# Patient Record
Sex: FEMALE | Race: WHITE | Employment: UNEMPLOYED | URBAN - METROPOLITAN AREA
[De-identification: names, ages, dates, MRNs, and addresses within clinical notes are randomized per-mention and may not be internally consistent; named-entity substitution may affect disease eponyms.]

---

## 2020-08-30 ENCOUNTER — HOSPITAL ENCOUNTER (OUTPATIENT)
Age: 35
Discharge: HOME | End: 2020-08-30
Payer: MEDICAID

## 2020-08-30 VITALS
HEIGHT: 68 IN | WEIGHT: 130 LBS | HEART RATE: 66 BPM | DIASTOLIC BLOOD PRESSURE: 74 MMHG | RESPIRATION RATE: 18 BRPM | OXYGEN SATURATION: 98 % | BODY MASS INDEX: 19.7 KG/M2 | SYSTOLIC BLOOD PRESSURE: 106 MMHG | TEMPERATURE: 99 F

## 2020-08-30 DIAGNOSIS — J02.0 STREP PHARYNGITIS: Primary | ICD-10-CM

## 2020-08-30 LAB — POCT RAPID STREP: POSITIVE

## 2020-08-30 PROCEDURE — 99202 OFFICE O/P NEW SF 15 MIN: CPT | Performed by: PHYSICIAN ASSISTANT

## 2020-08-30 PROCEDURE — 87880 STREP A ASSAY W/OPTIC: CPT | Performed by: PHYSICIAN ASSISTANT

## 2020-08-30 RX ORDER — AZITHROMYCIN 250 MG/1
TABLET, FILM COATED ORAL
Qty: 1 PACKAGE | Refills: 0 | Status: SHIPPED | OUTPATIENT
Start: 2020-08-30 | End: 2020-09-04

## 2020-08-30 RX ORDER — DEXAMETHASONE 2 MG/1
10 TABLET ORAL ONCE
Qty: 5 TABLET | Refills: 0 | Status: SHIPPED | OUTPATIENT
Start: 2020-08-30 | End: 2020-08-30

## 2020-08-30 NOTE — ED INITIAL ASSESSMENT (HPI)
Since Friday, c/o sore throat, tender neck, dizziness, body aches and chills. Denies fever. Took Motrin at 0900 today.

## 2020-08-30 NOTE — ED PROVIDER NOTES
Patient Seen in: [facility]      History   Patient presents with:  Sore Throat    Stated Complaint: sore throat/bodyaches/chills    HPI    CHIEF COMPLAINT: Sore throat, body aches, chills    HISTORY OF PRESENT ILLNESS: Mara (Temporal)   Resp 18   Ht 172.7 cm (5' 8\")   Wt 59 kg   LMP 08/05/2020   SpO2 98%   BMI 19.77 kg/m²         Physical Exam    Vital signs and nursing notes reviewed    General Appearance: alert and oriented x 4, no acute distress  Eyes:  pupils equal and r prevent relapse or worsening. I discussed the case with the patient and they had no questions, complaints, or concerns. Patient states they understand diagnosis, followup plan and agree with and understand  discharge instructions and plan.  I answered al

## 2020-11-05 ENCOUNTER — HOSPITAL ENCOUNTER (OUTPATIENT)
Age: 35
Discharge: HOME | End: 2020-11-05
Payer: MEDICAID

## 2020-11-05 VITALS
HEIGHT: 67.99 IN | HEART RATE: 59 BPM | TEMPERATURE: 98 F | SYSTOLIC BLOOD PRESSURE: 117 MMHG | OXYGEN SATURATION: 99 % | BODY MASS INDEX: 19.25 KG/M2 | DIASTOLIC BLOOD PRESSURE: 74 MMHG | RESPIRATION RATE: 16 BRPM | WEIGHT: 127 LBS

## 2020-11-05 DIAGNOSIS — Z20.822 PERSON UNDER INVESTIGATION FOR COVID-19: ICD-10-CM

## 2020-11-05 DIAGNOSIS — B34.9 VIRAL SYNDROME: Primary | ICD-10-CM

## 2020-11-05 PROCEDURE — 99213 OFFICE O/P EST LOW 20 MIN: CPT | Performed by: PHYSICIAN ASSISTANT

## 2020-11-05 NOTE — ED PROVIDER NOTES
Patient Seen in: [facility] [address] [place] [address]      History   Patient presents with:  Runny Nose  Body ache and/or chills    Stated Complaint: Bridger Tejeda / Cortney Paulson / Amee Hayes / Nelda De La Rosa    [facility]    50-year-old female who comes in today complaining of genera unlabored      ED Course     Labs Reviewed   SARS-COV-2 RNA,QUAL RT-PCR ([facility])                MDM    I discussed with the patient quarantine instructions, COVID-19 instructions and conservative care.  Patient will remain self quarantined until results are

## 2020-11-05 NOTE — ED INITIAL ASSESSMENT (HPI)
Pt c/o nasal congestion and chills starting last night. Pt denies fever.  Pt is requesting a covid test.

## 2021-09-15 ENCOUNTER — PATIENT MESSAGE (OUTPATIENT)
Dept: ADMINISTRATIVE | Facility: HOSPITAL | Age: 36
End: 2021-09-15

## 2021-09-15 ENCOUNTER — TELEPHONE (OUTPATIENT)
Dept: INTERNAL MEDICINE CLINIC | Facility: HOSPITAL | Age: 36
End: 2021-09-15

## 2021-09-15 DIAGNOSIS — Z20.822 EXPOSURE TO CONFIRMED CASE OF COVID-19: Primary | ICD-10-CM

## 2021-09-15 NOTE — TELEPHONE ENCOUNTER
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 days  When was the last shift you worked? 9/14/2021  When are you next scheduled to work?  Completed rotation    Did you have close contact with someone on your unit while not wearing a mask? ( exposure). [x] Asymptomatic AND vaccinated or COVID infection in past 3 months: May work and continue to monitor symptoms for the                                       next 14 days. Test w/ [facility] 3-5 days after exposure.

## 2021-09-17 ENCOUNTER — LAB ENCOUNTER (OUTPATIENT)
Dept: LAB | Age: 36
End: 2021-09-17
Attending: PREVENTIVE MEDICINE

## 2021-09-17 DIAGNOSIS — Z20.822 EXPOSURE TO CONFIRMED CASE OF COVID-19: ICD-10-CM

## 2021-09-19 LAB — SARS-COV-2 RNA RESP QL NAA+PROBE: NOT DETECTED

## 2021-09-19 NOTE — TELEPHONE ENCOUNTER
Results and RTW guidelines:    COVID RESULT:    [x] Viewed by employee in [address] RTW plan and instructions as indicated on triage call. Estimated RTW date: n/a finished rotation  [] Discussed with employee   [] Unable to reach by phone.   Sent via My

## 2021-11-06 RX ORDER — ALBUTEROL SULFATE 90 UG/1
2 AEROSOL, METERED RESPIRATORY (INHALATION) EVERY 6 HOURS PRN
Qty: 1 EACH | Refills: 0 | Status: SHIPPED | OUTPATIENT
Start: 2021-11-06 | End: 2022-11-06

## 2023-04-07 RX ORDER — CEFAZOLIN SODIUM/WATER 2 G/20 ML
2 SYRINGE (ML) INTRAVENOUS ONCE
Status: CANCELLED | OUTPATIENT
Start: 2023-04-07 | End: 2023-04-07

## 2023-04-17 NOTE — H&P
[facility]    PATIENT'S NAME: Carlita Fitch PHYSICIAN: Liliana Flaherty M.D. PATIENT ACCOUNT#:   [de-identified]    LOCATION:    MEDICAL RECORD #:   [id number]       YOB: 1985  ADMISSION DATE:       2023    HISTORY AND PHYSICAL EXAMINATION    CHIEF COMPLAINT:  \"I'm here to have hysteroscopy. \"    HISTORY OF PRESENT ILLNESS:  The patient is a 68-year-old female,  3, para 2-0-1-2, who was seen in the office with complaints of heavy periods. The patient notes her periods have been heavy for approximately 7 years. She notes on her heavy days, she changes a tampon and a pad every hour. She does have clotting. The clots are dime size to quarter size. She had a GYN ultrasound saline infusion sonogram performed 2023 which noted a retroflexed uterus with a 12.12 mm endometrial lining. No adnexal masses were noted. There was a 3.3 x 0.2 x 2.0 cm area within the endometrial canal suspicious for an endometrial polyp. These results were discussed with the patient. Recommendation of operative hysteroscopy with TruClear discussed. Risks and benefits discussed, and the patient presently desires to proceed with operative hysteroscopy with TruClear. PAST MEDICAL HISTORY:  Anemia, psoriasis. PAST SURGICAL HISTORY:  Noncontributory. MEDICATIONS:  Present medications are amino acid, B12, B6, broccoli seed extract, creatine, D-chiro, D3, fish oil, folate, Gume-Inositol, N-acetyl lysine, resveratrol, Urolithin A. ALLERGIES:  The patient is allergic to penicillin. She gets a rash. FAMILY HISTORY:  Breast cancer in maternal great-grandmother, hypertension in her mother, elevated cholesterol in her mother, lung cancer in paternal grandmother, and polycythemia in her mother. SOCIAL HISTORY:  Occasional EtOH. No tobacco.  No drugs. GYNECOLOGICAL HISTORY:  Menarche age 14. History of regular menses monthly, lasting 8 days, presently heavy.   No history of any sexually transmitted diseases. No history of an abnormal Pap smear. OBSTETRICAL HISTORY:  In , 7 weeks, spontaneous AB, no D and C. On 2010, 39 weeks, 3 hours of labor, 7 pound 6 ounce female infant, normal vaginal delivery, no complications. Her daughter, Izabella Virgen, was delivered in [place]. On 2013, 39 weeks, 6 pound 12 ounce female infant, normal vaginal delivery. Her daughter, Dilip Barros, was delivered in [place]. PHYSICAL EXAMINATION:    GENERAL:  The patient is 5 feet 7 inches. She weighs 131 pounds. VITAL SIGNS:  Blood pressure 116/70. HEENT:  Within normal limits. LUNGS:  Clear. HEART:  Regular rate and rhythm. ABDOMEN:  Benign. EXTREMITIES:  No CCE. PELVIC:  BUS, external genitalia within normal limits. Vagina, normal mucosa. Cervix, no gross lesions. Bimanual, no palpable abnormal masses, nontender. ASSESSMENT:    3   A 48-year-old female,  3, para 2-0-1-2, with complaints of abnormal uterine bleeding consistent with menorrhagia. 2.   Abnormal saline infusion sonogram.  3.   Low ferritin. PLAN:  The patient is scheduled for operative hysteroscopy with TruClear. The procedure and risks of procedure were discussed in depth with the patient. ACOG hysteroscopy and abnormal bleeding brochures and TruClear brochures were previously given to the patient to review. Procedure, risks of procedure discussed, and the patient consents to the above. Alternative forms of treatment and observation were discussed, and the patient declined. Preop labs done. Postop care discussed. Postop information given. The patient is to follow up for surgery on 2023 as scheduled or call as needed.      Dictated By Maci Varner M.D.  d: 2023 09:34:13  t: 2023 10:07:18  Job [id number]  [facility]/

## 2023-04-19 ENCOUNTER — HOSPITAL ENCOUNTER (OUTPATIENT)
Facility: HOSPITAL | Age: 38
Setting detail: HOSPITAL OUTPATIENT SURGERY
Discharge: HOME | End: 2023-04-19
Attending: OBSTETRICS & GYNECOLOGY | Admitting: OBSTETRICS & GYNECOLOGY
Payer: COMMERCIAL

## 2023-04-19 ENCOUNTER — ANESTHESIA EVENT (OUTPATIENT)
Dept: SURGERY | Facility: HOSPITAL | Age: 38
End: 2023-04-19
Payer: COMMERCIAL

## 2023-04-19 ENCOUNTER — ANESTHESIA (OUTPATIENT)
Dept: SURGERY | Facility: HOSPITAL | Age: 38
End: 2023-04-19
Payer: COMMERCIAL

## 2023-04-19 VITALS
HEIGHT: 68 IN | OXYGEN SATURATION: 100 % | HEART RATE: 53 BPM | WEIGHT: 131 LBS | BODY MASS INDEX: 19.85 KG/M2 | SYSTOLIC BLOOD PRESSURE: 94 MMHG | TEMPERATURE: 98 F | DIASTOLIC BLOOD PRESSURE: 61 MMHG | RESPIRATION RATE: 16 BRPM

## 2023-04-19 LAB — B-HCG UR QL: NEGATIVE

## 2023-04-19 PROCEDURE — 88305 TISSUE EXAM BY PATHOLOGIST: CPT | Performed by: OBSTETRICS & GYNECOLOGY

## 2023-04-19 PROCEDURE — 81025 URINE PREGNANCY TEST: CPT

## 2023-04-19 PROCEDURE — 0UB98ZX EXCISION OF UTERUS, VIA NATURAL OR ARTIFICIAL OPENING ENDOSCOPIC, DIAGNOSTIC: ICD-10-PCS | Performed by: OBSTETRICS & GYNECOLOGY

## 2023-04-19 RX ORDER — KETOROLAC TROMETHAMINE 30 MG/ML
INJECTION, SOLUTION INTRAMUSCULAR; INTRAVENOUS AS NEEDED
Status: DISCONTINUED | OUTPATIENT
Start: 2023-04-19 | End: 2023-04-19 | Stop reason: SURG

## 2023-04-19 RX ORDER — MIDAZOLAM HYDROCHLORIDE 1 MG/ML
1 INJECTION INTRAMUSCULAR; INTRAVENOUS EVERY 5 MIN PRN
Status: DISCONTINUED | OUTPATIENT
Start: 2023-04-19 | End: 2023-04-19

## 2023-04-19 RX ORDER — LABETALOL HYDROCHLORIDE 5 MG/ML
5 INJECTION, SOLUTION INTRAVENOUS EVERY 5 MIN PRN
Status: DISCONTINUED | OUTPATIENT
Start: 2023-04-19 | End: 2023-04-19

## 2023-04-19 RX ORDER — HYDROCODONE BITARTRATE AND ACETAMINOPHEN 5; 325 MG/1; MG/1
1 TABLET ORAL ONCE AS NEEDED
Status: DISCONTINUED | OUTPATIENT
Start: 2023-04-19 | End: 2023-04-19

## 2023-04-19 RX ORDER — LIDOCAINE HYDROCHLORIDE 10 MG/ML
INJECTION, SOLUTION INFILTRATION; PERINEURAL AS NEEDED
Status: DISCONTINUED | OUTPATIENT
Start: 2023-04-19 | End: 2023-04-19 | Stop reason: HOSPADM

## 2023-04-19 RX ORDER — CEFAZOLIN SODIUM/WATER 2 G/20 ML
2 SYRINGE (ML) INTRAVENOUS ONCE
Status: COMPLETED | OUTPATIENT
Start: 2023-04-19 | End: 2023-04-19

## 2023-04-19 RX ORDER — HYDROMORPHONE HYDROCHLORIDE 1 MG/ML
0.6 INJECTION, SOLUTION INTRAMUSCULAR; INTRAVENOUS; SUBCUTANEOUS EVERY 5 MIN PRN
Status: DISCONTINUED | OUTPATIENT
Start: 2023-04-19 | End: 2023-04-19

## 2023-04-19 RX ORDER — SODIUM CHLORIDE, SODIUM LACTATE, POTASSIUM CHLORIDE, CALCIUM CHLORIDE 600; 310; 30; 20 MG/100ML; MG/100ML; MG/100ML; MG/100ML
INJECTION, SOLUTION INTRAVENOUS CONTINUOUS
Status: DISCONTINUED | OUTPATIENT
Start: 2023-04-19 | End: 2023-04-19

## 2023-04-19 RX ORDER — SCOLOPAMINE TRANSDERMAL SYSTEM 1 MG/1
1 PATCH, EXTENDED RELEASE TRANSDERMAL ONCE
Status: DISCONTINUED | OUTPATIENT
Start: 2023-04-19 | End: 2023-04-19 | Stop reason: HOSPADM

## 2023-04-19 RX ORDER — ACETAMINOPHEN 500 MG
1000 TABLET ORAL ONCE
Status: DISCONTINUED | OUTPATIENT
Start: 2023-04-19 | End: 2023-04-19 | Stop reason: HOSPADM

## 2023-04-19 RX ORDER — CEFAZOLIN SODIUM/WATER 2 G/20 ML
SYRINGE (ML) INTRAVENOUS
Status: DISCONTINUED
Start: 2023-04-19 | End: 2023-04-19

## 2023-04-19 RX ORDER — LIDOCAINE HYDROCHLORIDE 10 MG/ML
INJECTION, SOLUTION EPIDURAL; INFILTRATION; INTRACAUDAL; PERINEURAL AS NEEDED
Status: DISCONTINUED | OUTPATIENT
Start: 2023-04-19 | End: 2023-04-19 | Stop reason: SURG

## 2023-04-19 RX ORDER — HYDROCODONE BITARTRATE AND ACETAMINOPHEN 5; 325 MG/1; MG/1
2 TABLET ORAL ONCE AS NEEDED
Status: DISCONTINUED | OUTPATIENT
Start: 2023-04-19 | End: 2023-04-19

## 2023-04-19 RX ORDER — NALOXONE HYDROCHLORIDE 0.4 MG/ML
80 INJECTION, SOLUTION INTRAMUSCULAR; INTRAVENOUS; SUBCUTANEOUS AS NEEDED
Status: DISCONTINUED | OUTPATIENT
Start: 2023-04-19 | End: 2023-04-19

## 2023-04-19 RX ORDER — VASOPRESSIN 20 U/ML
INJECTION PARENTERAL AS NEEDED
Status: DISCONTINUED | OUTPATIENT
Start: 2023-04-19 | End: 2023-04-19 | Stop reason: HOSPADM

## 2023-04-19 RX ORDER — HYDROMORPHONE HYDROCHLORIDE 1 MG/ML
0.4 INJECTION, SOLUTION INTRAMUSCULAR; INTRAVENOUS; SUBCUTANEOUS EVERY 5 MIN PRN
Status: DISCONTINUED | OUTPATIENT
Start: 2023-04-19 | End: 2023-04-19

## 2023-04-19 RX ORDER — MEPERIDINE HYDROCHLORIDE 25 MG/ML
12.5 INJECTION INTRAMUSCULAR; INTRAVENOUS; SUBCUTANEOUS AS NEEDED
Status: DISCONTINUED | OUTPATIENT
Start: 2023-04-19 | End: 2023-04-19

## 2023-04-19 RX ORDER — MIDAZOLAM HYDROCHLORIDE 1 MG/ML
INJECTION INTRAMUSCULAR; INTRAVENOUS AS NEEDED
Status: DISCONTINUED | OUTPATIENT
Start: 2023-04-19 | End: 2023-04-19 | Stop reason: SURG

## 2023-04-19 RX ORDER — ONDANSETRON 2 MG/ML
4 INJECTION INTRAMUSCULAR; INTRAVENOUS EVERY 6 HOURS PRN
Status: DISCONTINUED | OUTPATIENT
Start: 2023-04-19 | End: 2023-04-19

## 2023-04-19 RX ORDER — PROCHLORPERAZINE EDISYLATE 5 MG/ML
5 INJECTION INTRAMUSCULAR; INTRAVENOUS EVERY 8 HOURS PRN
Status: DISCONTINUED | OUTPATIENT
Start: 2023-04-19 | End: 2023-04-19

## 2023-04-19 RX ORDER — HYDROMORPHONE HYDROCHLORIDE 1 MG/ML
0.2 INJECTION, SOLUTION INTRAMUSCULAR; INTRAVENOUS; SUBCUTANEOUS EVERY 5 MIN PRN
Status: DISCONTINUED | OUTPATIENT
Start: 2023-04-19 | End: 2023-04-19

## 2023-04-19 RX ORDER — ACETAMINOPHEN 500 MG
1000 TABLET ORAL ONCE AS NEEDED
Status: DISCONTINUED | OUTPATIENT
Start: 2023-04-19 | End: 2023-04-19

## 2023-04-19 RX ORDER — ONDANSETRON 2 MG/ML
INJECTION INTRAMUSCULAR; INTRAVENOUS AS NEEDED
Status: DISCONTINUED | OUTPATIENT
Start: 2023-04-19 | End: 2023-04-19 | Stop reason: SURG

## 2023-04-19 RX ADMIN — CEFAZOLIN SODIUM/WATER 2 G: 2 G/20 ML SYRINGE (ML) INTRAVENOUS at 12:06:00

## 2023-04-19 RX ADMIN — KETOROLAC TROMETHAMINE 30 MG: 30 INJECTION, SOLUTION INTRAMUSCULAR; INTRAVENOUS at 12:01:00

## 2023-04-19 RX ADMIN — ONDANSETRON 4 MG: 2 INJECTION INTRAMUSCULAR; INTRAVENOUS at 11:59:00

## 2023-04-19 RX ADMIN — LIDOCAINE HYDROCHLORIDE 50 MG: 10 INJECTION, SOLUTION EPIDURAL; INFILTRATION; INTRACAUDAL; PERINEURAL at 11:36:00

## 2023-04-19 RX ADMIN — MIDAZOLAM HYDROCHLORIDE 2 MG: 1 INJECTION INTRAMUSCULAR; INTRAVENOUS at 11:34:00

## 2023-04-19 NOTE — DISCHARGE INSTRUCTIONS
Home Care Instructions Following Your Hysteroscopy     Dora-  We hope you were pleased with your care at [facility].  We wish you the best outcome and overall experience with your operation. These instructions will help to minimize pain, limit the risk for an infection, and improve the likelihood of a successful result. What to Expect:  Expect some vaginal bleeding, watery vaginal discharge, or spotting for 1-2 weeks after your procedure  You might also experience abdominal cramping for 1-2 days  Call the office, if you experience heavy bleeding (saturating a pad every hour)    Over-The-Counter Medication  Non-prescription anti-inflammatory medications can also help to ease the pain.   You can take Aleve, Tylenol or Ibuprofen   Take as directed on the bottle  Drink a full glass of water with the medication    Bathing/Showers  You may resume showers in one day  No baths, swimming, hot tubs for two weeks    Home Medication  Resume your home medications as instructed    Diet  Resume your normal diet    Activity  Refrain from vaginal intercourse, vaginal suppositories, tampon use or douches until your first office appointment with the doctor  No strenuous activity or heavy lifting for two days  You may go up and down the stairs as tolerated    No physical exercise, sports, or gym workouts for two days    Return to Work or [employer] may return to work in two days  Contact your gynecologist's office if you need a medical note  You may return to school in two days with no restrictions    Driving  You may resume driving tomorrow    Follow-up Appointment with Your Gynecologist  Call your gynecologist's office today for an appointment in two weeks    The number is [phone]  Verify your appointment date, day, time, and location  At your postoperative office appointment, your progress will be evaluated, findings reviewed, and any additional concerns and instructions will be discussed    Questions or Concerns  Call the office if you experience severe pain not controlled by pain medication, swelling, heavy bleeding, foul smelling vaginal discharge, shortness of breath, chest pain, leg pain, fever (100.4 or greater), or other concerns  The number is: [phone]  If your call is made after office hours, the physician on call will be available to help you. There is always a doctor from the group covering our gynecology patients, when your provider is unavailable    Alex Amin,  Thank you for coming to [facility] for your operation. The nurses and the anesthesiologist try very hard to make sure you receive the best care possible. Your trust in them as well as us is greatly appreciated.   Thanks so much,  [facility] or [facility]

## 2023-04-19 NOTE — BRIEF OP NOTE
[facility]  Post-Op Procedure Note    Zigmund Handy Patient Status:  Hospital Outpatient Surgery    1985 MRN [id number]   [facility] SURGERY Attending Dirk Lopez MD   Hosp Day # 0 PCP Magdalena Pitts MD     Preoperative Diagnosis: ABNORMAL UTERUS BLEEDING  Postoperative Diagnosis: ABNORMAL UTERUS BLEEDING  Procedure: OPERATIVE HYSTEROSCOPY WITH Alma Ricketts     Primary surgeon: Zayra Smith MD  Surgical Findings: Multiple endometrial polyps, thickened posterior endometrium otherwise normal endometrial cavity  Anesthesia: MAC-Dr [facility]  Anesthesiologist: Annalise Franklin MD  Antibiotics: Ancef 2 gm IV PB prior to procedure   Anti-Infectives          ceFAZolin (Ancef) 2 g in 20mL IV syringe premix (Completed)    ceFAZolin (Ancef) 2 g/20mL IV syringe premix        IV Fluids:  600 mL  Hysteroscopic Deficit: 115 mL  Urine Output:   100 ml   Estimated blood loss: 5 mL  Counts: Correct  Specimen:  Endometrial polyps and endometrial biopsies  Complications: None. Patient tolerated the procedure well. Condition: To same day surgery in stable condition.        Zayra Smith MD   2023  12:14 PM

## 2023-04-23 NOTE — OPERATIVE REPORT
[facility]    PATIENT'S NAME: Domingo Cardenas   ATTENDING PHYSICIAN: Dillon Marte M.D. OPERATING PHYSICIAN: Dillon Marte M.D. PATIENT ACCOUNT#:   [de-identified]    LOCATION:  [place] [address]  MEDICAL RECORD #:   [id number]       YOB: 1985  ADMISSION DATE:       04/19/2023      OPERATION DATE:  04/19/2023    OPERATIVE REPORT    PREOPERATIVE DIAGNOSIS:  Abnormal uterine bleeding. POSTOPERATIVE DIAGNOSIS:  Abnormal uterine bleeding, endometrial polyps. PROCEDURE:  Operative hysteroscopy with polypectomies and endometrial biopsies with TruClear. OPERATIVE TECHNIQUE:  The patient was brought into the operating room and placed in the dorsal supine position. MAC anesthesia was initiated by Dr. El Gates. The patient was placed in dorsal lithotomy position and prepped and draped in the normal sterile fashion. A red rubber catheter was used to drain her bladder of approximately 100 mL of clear urine. A speculum was placed inside the vagina. The cervix was grasped with a tenaculum. Then, 20 mL of lidocaine/Pitressin solution was injected into the cervix. The uterus was sounded to 8 to 9 cm. The cervix was then serially dilated to #6 Hegar dilator. The operating hysteroscope was placed inside the uterine cavity. It was difficult initially to visualize the inside of the uterine cavity because there was a copious amount of tissue, but eventually, the ostia was visualized on the right and it was noted that we were in the proper place of the endometrial cavity. There were multiple endometrial polyps which were noted. The TruClear soft tissue morcellator was then used to remove the endometrial polyps. The posterior wall of the endometrium was thickened and this was biopsied as well. The remainder of the cavity appeared smooth. Both ostia were visualized and noted to be within normal limits. The operating hysteroscope was removed from the endometrial cavity. Input for the procedure was 600 mL of IV fluid. Urine output 100 mL. Hysteroscopic deficit 115 mL. Estimated blood loss 5 mL. Counts correct. Specimens, endometrial polyps and endometrial biopsies were sent to Pathology for evaluation. Complications, none. The patient tolerated the procedure well. She went to same-day surgery in stable condition. The patient has been given postoperative instructions. She will follow up in the office in 7 to 10 days or call as needed.     Dictated By Mono Soto M.D.  d: 04/22/2023 20:45:55  t: 04/22/2023 22:06:31  [facility] [id number]  [facility]/

## (undated) DEVICE — SOL NACL IRRIG 0.9% 1000ML BTL

## (undated) DEVICE — GYN CDS: Brand: MEDLINE INDUSTRIES, INC.

## (undated) DEVICE — INFLOWHYSTER S&N

## (undated) DEVICE — SEAL TRUCLEAR  HYSTERSCOP

## (undated) DEVICE — OUTFLOW HYSTER S&N

## (undated) DEVICE — SLEEVE KENDALL SCD EXPRESS MED

## (undated) DEVICE — MEDI-VAC NON-CONDUCTIVE SUCTION TUBING: Brand: CARDINAL HEALTH

## (undated) DEVICE — PREMIUM WET SKIN PREP TRAY: Brand: MEDLINE INDUSTRIES, INC.

## (undated) DEVICE — 1010 S-DRAPE TOWEL DRAPE 10/BX: Brand: STERI-DRAPE™

## (undated) DEVICE — SOLUTION  .9 3000ML

## (undated) DEVICE — 2000CC GUARDIAN II: Brand: GUARDIAN

## (undated) DEVICE — SPECIMEN SOCK - STANDARD: Brand: MEDI-VAC

## (undated) DEVICE — NEEDLE SPINAL 22X3-1/2 BLK

## (undated) DEVICE — STERILE POLYISOPRENE POWDER-FREE SURGICAL GLOVES: Brand: PROTEXIS

## (undated) DEVICE — DEV REMOVAL TRUCLEAR SFT MINI